# Patient Record
Sex: MALE | Race: WHITE
[De-identification: names, ages, dates, MRNs, and addresses within clinical notes are randomized per-mention and may not be internally consistent; named-entity substitution may affect disease eponyms.]

---

## 2018-02-21 ENCOUNTER — HOSPITAL ENCOUNTER (EMERGENCY)
Dept: HOSPITAL 45 - C.EDB | Age: 38
Discharge: HOME | End: 2018-02-21
Payer: SELF-PAY

## 2018-02-21 VITALS — SYSTOLIC BLOOD PRESSURE: 145 MMHG | OXYGEN SATURATION: 94 % | HEART RATE: 103 BPM | DIASTOLIC BLOOD PRESSURE: 82 MMHG

## 2018-02-21 VITALS
BODY MASS INDEX: 45.1 KG/M2 | HEIGHT: 70 IN | WEIGHT: 315 LBS | BODY MASS INDEX: 45.1 KG/M2 | WEIGHT: 315 LBS | HEIGHT: 70 IN

## 2018-02-21 VITALS — HEART RATE: 79 BPM | OXYGEN SATURATION: 98 %

## 2018-02-21 VITALS — TEMPERATURE: 98.42 F

## 2018-02-21 DIAGNOSIS — J18.9: Primary | ICD-10-CM

## 2018-02-21 DIAGNOSIS — F17.200: ICD-10-CM

## 2018-02-21 DIAGNOSIS — Z87.09: ICD-10-CM

## 2018-02-21 LAB
ALBUMIN SERPL-MCNC: 3.6 GM/DL (ref 3.4–5)
ALP SERPL-CCNC: 83 U/L (ref 45–117)
ALT SERPL-CCNC: 38 U/L (ref 12–78)
AST SERPL-CCNC: 27 U/L (ref 15–37)
BASOPHILS # BLD: 0.01 K/UL (ref 0–0.2)
BASOPHILS NFR BLD: 0.2 %
BUN SERPL-MCNC: 16 MG/DL (ref 7–18)
CALCIUM SERPL-MCNC: 8.8 MG/DL (ref 8.5–10.1)
CO2 SERPL-SCNC: 24 MMOL/L (ref 21–32)
CREAT SERPL-MCNC: 0.94 MG/DL (ref 0.6–1.4)
EOS ABS #: 0.27 K/UL (ref 0–0.5)
EOSINOPHIL NFR BLD AUTO: 175 K/UL (ref 130–400)
GLUCOSE SERPL-MCNC: 125 MG/DL (ref 70–99)
HCT VFR BLD CALC: 40.4 % (ref 42–52)
HGB BLD-MCNC: 14.3 G/DL (ref 14–18)
IG#: 0.02 K/UL (ref 0–0.02)
IMM GRANULOCYTES NFR BLD AUTO: 38.5 %
LYMPHOCYTES # BLD: 2.34 K/UL (ref 1.2–3.4)
MCH RBC QN AUTO: 30.8 PG (ref 25–34)
MCHC RBC AUTO-ENTMCNC: 35.4 G/DL (ref 32–36)
MCV RBC AUTO: 87.1 FL (ref 80–100)
MONO ABS #: 0.57 K/UL (ref 0.11–0.59)
MONOCYTES NFR BLD: 9.4 %
NEUT ABS #: 2.87 K/UL (ref 1.4–6.5)
NEUTROPHILS # BLD AUTO: 4.4 %
NEUTROPHILS NFR BLD AUTO: 47.2 %
PMV BLD AUTO: 10.4 FL (ref 7.4–10.4)
POTASSIUM SERPL-SCNC: 4.1 MMOL/L (ref 3.5–5.1)
PROT SERPL-MCNC: 7.4 GM/DL (ref 6.4–8.2)
RED CELL DISTRIBUTION WIDTH CV: 13.1 % (ref 11.5–14.5)
RED CELL DISTRIBUTION WIDTH SD: 42.1 FL (ref 36.4–46.3)
SODIUM SERPL-SCNC: 142 MMOL/L (ref 136–145)
WBC # BLD AUTO: 6.08 K/UL (ref 4.8–10.8)

## 2018-02-21 NOTE — DIAGNOSTIC IMAGING REPORT
CHEST 2 VIEWS ROUTINE



CLINICAL HISTORY: Shortness of breath    



COMPARISON STUDY:  2/10/2016



FINDINGS: The cardiac and mediastinal contours are normal. There is no evidence

of focal pulmonary consolidation. There is no evidence of failure. No pleural

effusions are visualized.[Rounded within both mid to lower lung zones are felt

to represent nipple shadows.



IMPRESSION: No active disease in the chest.







Electronically signed by:  Tej Russ M.D.

2/21/2018 6:49 AM



Dictated Date/Time:  2/21/2018 6:49 AM

## 2018-02-21 NOTE — EMERGENCY ROOM VISIT NOTE
History


First contact with patient:  02:50


Chief Complaint:  RESPIRATORY PROBLEMS


Stated Complaint:  TROUBLE BREATHING


Nursing Triage Summary:  


Pt became SOB @ work. Pt denies any other discomforts. Pt has h/o sarcoidosis.





History of Present Illness


The patient is a 38 year old male who presents to the Emergency Room with 

complaints of chest tightness and shortness of breath that began worsening over 

the past one to 2 days.  The patient has a long-standing history of bronchitis 

and asthma-like symptoms.  He has a history of sarcoidosis, and works in home 

restoration with mold and dust.  He has not had recent fever or chills, but has 

had a worsening cough.  He has been using inhalers at home without relief of 

symptoms.  The patient rates his discomfort an 8/10.





Review of Systems


More than 10 systems were reviewed and otherwise negative with the exception of 

history of present illness.





Past Medical/Surgical History


Medical Problems:


(1) Bronchitis


(2) Hypoxia


(3) Sarcoid








Family History


No pertinent family history





Social History


Smoking Status:  Current Every Day Smoker


Alcohol Use:  occasionally


Marital Status:  single


Housing Status:  lives with family


Occupation Status:  employed





Current/Historical Medications


Scheduled


Levofloxacin (Levaquin), 1 TAB PO DAILY


Prednisone (Prednisone), 0 PO DAILY





Physical Exam


Vital Signs











  Date Time  Temp Pulse Resp B/P (MAP) Pulse Ox O2 Delivery O2 Flow Rate FiO2


 


2/21/18 05:10  103 22 145/82 94 Room Air  


 


2/21/18 04:19  89 16 171/71 97 Mask 8.0 


 


2/21/18 03:52  79 16  98 Room Air  


 


2/21/18 03:43  83      


 


2/21/18 02:45 36.9 89 20 152/85 96 Room Air  











Physical Exam


VITALS: Vitals are noted on the nurse's note and reviewed by myself.  Vital 

signs stable.


GENERAL: Well-developed, well-nourished, white male, who is in no acute 

distress and resting comfortably. Patient is cooperative with the examination.


HEAD: Normocephalic atraumatic.  


EARS: External ear normal. External auditory canals clear, tympanic membranes 

pearly gray without erythema or effusion bilaterally.


EYES: Pupils equal round and reactive to light and accommodation.  Conjunctivae 

without injection, sclerae without icterus.  Extraocular movements intact.  


NOSE: Patent, turbinates without inflammation or discharge.  


MOUTH: Mucous membranes moist.  Tonsils are not enlarged.  Pharynx without 

erythema, blood, or exudate.  Uvula midline.  Airway patent.   


NECK: Supple without nuchal rigidity.  No lymphadenopathy.  No thyromegaly.  

Cervical spine is nontender.  


HEART: Regular rate and rhythm without murmurs gallops or rubs.


LUNGS: Distant and decreased breath sounds bilateral with left-sided wheezing.  

After treatment lung sounds are clear with scant scattered rhonchi throughout.


ABDOMEN: Positive normal bowel sounds x 4.  Soft, nontender, without masses or 

organomegaly.  No guarding or rebound tenderness.


MUSCULOSKELETAL: No muscle atrophy, erythema, or edema noted.  Full range of 

motion without joint tenderness in all extremities





Medical Decision & Procedures


Laboratory Results


2/21/18 03:38








Red Blood Count 4.64, Mean Corpuscular Volume 87.1, Mean Corpuscular Hemoglobin 

30.8, Mean Corpuscular Hemoglobin Concent 35.4, Mean Platelet Volume 10.4, 

Neutrophils (%) (Auto) 47.2, Lymphocytes (%) (Auto) 38.5, Monocytes (%) (Auto) 

9.4, Eosinophils (%) (Auto) 4.4, Basophils (%) (Auto) 0.2, Neutrophils # (Auto) 

2.87, Lymphocytes # (Auto) 2.34, Monocytes # (Auto) 0.57, Eosinophils # (Auto) 

0.27, Basophils # (Auto) 0.01





2/21/18 03:38

















Test


  2/21/18


03:38


 


White Blood Count


  6.08 K/uL


(4.8-10.8)


 


Red Blood Count


  4.64 M/uL


(4.7-6.1)


 


Hemoglobin


  14.3 g/dL


(14.0-18.0)


 


Hematocrit 40.4 % (42-52) 


 


Mean Corpuscular Volume


  87.1 fL


()


 


Mean Corpuscular Hemoglobin


  30.8 pg


(25-34)


 


Mean Corpuscular Hemoglobin


Concent 35.4 g/dl


(32-36)


 


Platelet Count


  175 K/uL


(130-400)


 


Mean Platelet Volume


  10.4 fL


(7.4-10.4)


 


Neutrophils (%) (Auto) 47.2 % 


 


Lymphocytes (%) (Auto) 38.5 % 


 


Monocytes (%) (Auto) 9.4 % 


 


Eosinophils (%) (Auto) 4.4 % 


 


Basophils (%) (Auto) 0.2 % 


 


Neutrophils # (Auto)


  2.87 K/uL


(1.4-6.5)


 


Lymphocytes # (Auto)


  2.34 K/uL


(1.2-3.4)


 


Monocytes # (Auto)


  0.57 K/uL


(0.11-0.59)


 


Eosinophils # (Auto)


  0.27 K/uL


(0-0.5)


 


Basophils # (Auto)


  0.01 K/uL


(0-0.2)


 


RDW Standard Deviation


  42.1 fL


(36.4-46.3)


 


RDW Coefficient of Variation


  13.1 %


(11.5-14.5)


 


Immature Granulocyte % (Auto) 0.3 % 


 


Immature Granulocyte # (Auto)


  0.02 K/uL


(0.00-0.02)


 


Venous Blood pH


  7.44


(7.36-7.41)


 


Venous Blood Partial Pressure


CO2 39 mmHg


(38.0-50.0)


 


Venous Blood Partial Pressure


O2 64 mmHg 


 


 


Venous Blood HCO3 26 mmol/L 


 


Venous Blood Oxygen Saturation 92.4 % 


 


Venous Blood Base Excess 1.6 mEq/L 


 


Anion Gap


  10.0 mmol/L


(3-11)


 


Est Creatinine Clear Calc


Drug Dose 162.6 ml/min 


 


 


Estimated GFR (


American) 118.7 


 


 


Estimated GFR (Non-


American 102.4 


 


 


BUN/Creatinine Ratio 17.2 (10-20) 


 


Calcium Level


  8.8 mg/dl


(8.5-10.1)


 


Total Bilirubin


  0.4 mg/dl


(0.2-1)


 


Aspartate Amino Transf


(AST/SGOT) 27 U/L (15-37) 


 


 


Alanine Aminotransferase


(ALT/SGPT) 38 U/L (12-78) 


 


 


Alkaline Phosphatase


  83 U/L


()


 


Total Protein


  7.4 gm/dl


(6.4-8.2)


 


Albumin


  3.6 gm/dl


(3.4-5.0)


 


Globulin


  3.8 gm/dl


(2.5-4.0)


 


Albumin/Globulin Ratio 0.9 (0.9-2) 


 


Chemistry Specimen Hemolysis  











Medications Administered











 Medications


  (Trade)  Dose


 Ordered  Sig/Real


 Route  Start Time


 Stop Time Status Last Admin


Dose Admin


 


 Methylprednisolone


 Sodium Succinate


  (Solu-Medrol IV)  125 mg  NOW  STAT


 IV  2/21/18 03:02


 2/21/18 03:04 DC 2/21/18 03:32


125 MG


 


 Sodium Chloride  1,000 ml @ 


 999 mls/hr  Q1H1M ONCE


 IV  2/21/18 03:15


 2/21/18 04:15 DC 2/21/18 03:37


999 MLS/HR


 


 Albuterol/


 Ipratropium


  (Duoneb)  12 ml  NOW  ONCE


 INH  2/21/18 03:15


 2/21/18 03:16 DC 2/21/18 03:15


12 ML


 


 Levofloxacin


  (Levaquin Tab)  750 mg  STK-MED ONCE


 .ROUTE  2/21/18 04:16


 2/21/18 04:17 DC 2/21/18 04:19


750 MG











ED Course


Physical exam and history were performed. Nursing notes, EMR, and Medication 

List were personally reviewed. 





Patient appears to have shortness of breath worsening over the past few days.  

On examination he seems to have decreased breath sounds with some mild 

wheezing.  IV access was established and labs were obtained.  The patient was 

given IV Solu-Medrol and a one-hour DuoNeb.  Chest x-ray was performed.





The patient's blood work is as above and was reviewed.  He is not significantly 

elevated white blood cell count, gross anemia, bandemia, or significant 

electrolyte imbalance.


X-ray was performed and reviewed by myself and my attending with a questionable 

right middle lobe pneumonia.  The official radiology read is pending at the 

time of this dictation.





On reevaluation the patient had significant improvement of his symptoms after 

the breathing treatment and steroids.  I will start him on a course of Levaquin 

and prednisone.  He feels well for discharge home and this seems reasonable.  

The patient is to follow with his primary care physician for further care and 

management.  The patient was certainly invited back to the ER with any new, 

worsening, or concerning symptoms.





The chart was completed utilizing Dragon Speech Voice Recognition Software. 

Grammatical errors, random word insertions, pronoun errors, and incomplete 

sentences are an occasional consequence of this system due to software 

limitations, ambient noise, and hardware issues. Any formal questions or 

concerns about the content, text, or information contained within the body of 

this dictation should be directly addressed to the provider for clarification.


.





Medical Decision


Differential diagnosis:


Etiologies such as infections, reactive airway disease, pneumonia, pneumothorax

, COPD, CHF, cardiac ischemia, pulmonary embolism, musculoskeletal, 

gastrointestinal, as well as others were entertained.





Impression





 Primary Impression:  


 Pneumonia


 Additional Impression:  


 Shortness of breath





Departure Information


Dispostion


Home / Self-Care





Condition


FAIR





Prescriptions





Prednisone (Prednisone) 20 Mg Tab


0 PO DAILY, #18 TAB


   3 DAILY FOR 3 DAYS, THEN 2 DAILY FOR 3 DAYS, THEN 1 DAILY FOR 3 DAYS.


   Prov: Jose Diana PA-C         2/21/18 


Levofloxacin (LEVAQUIN) 750 Mg Tab


1 TAB PO DAILY for 10 Days, #10 TAB


   Prov: Jose Diana PA-C         2/21/18





Forms


HOME CARE DOCUMENTATION FORM,                                                 

               Work Instructions,          


   Additional Instructions:  Patient was seen and evaluated today in the 

emergency department fo


      medical care.  Return to work on 2/25/2018.  Please excuse.


IMPORTANT VISIT INFORMATION





Patient Instructions


My [facility]





Additional Instructions





You were seen and evaluated today on an emergency basis only.  This is not a 

substitute for, or an effort to provide, complete comprehensive medical care.  

It is not possible to recognize and treat all injuries or illnesses in a single 

emergency department visit. For this reason it is recommended that you followup 

with your primary care physician this week for recheck of your condition.





Take Levaquin 750 mg daily for the next 10 days.





Take prednisone as prescribed





Continue your inhalers at home





You are welcome to return to the emergency department anytime with new, 

worsening, or concerning symptoms.





Work Instructions


Additional Work Instructions:  


Patient was seen and evaluated today in the emergency department for 


medical care.  Return to work on 2/25/2018.  Please excuse.





Problem Qualifiers